# Patient Record
Sex: FEMALE | Race: BLACK OR AFRICAN AMERICAN | NOT HISPANIC OR LATINO | URBAN - NONMETROPOLITAN AREA
[De-identification: names, ages, dates, MRNs, and addresses within clinical notes are randomized per-mention and may not be internally consistent; named-entity substitution may affect disease eponyms.]

---

## 2019-07-17 ENCOUNTER — IMPORTED ENCOUNTER (OUTPATIENT)
Dept: URBAN - NONMETROPOLITAN AREA CLINIC 1 | Facility: CLINIC | Age: 74
End: 2019-07-17

## 2019-07-17 PROBLEM — H25.813: Noted: 2017-11-01

## 2019-07-17 PROBLEM — B58.00: Noted: 2018-03-14

## 2019-07-17 PROBLEM — H52.4: Noted: 2018-03-14

## 2019-07-17 PROBLEM — E11.3393: Noted: 2019-07-17

## 2019-07-17 PROCEDURE — 99213 OFFICE O/P EST LOW 20 MIN: CPT

## 2019-07-17 PROCEDURE — 92134 CPTRZ OPH DX IMG PST SGM RTA: CPT

## 2019-07-17 NOTE — PATIENT DISCUSSION
Type 2 DM 1992Last A1C 7.8% - Jan 2018. DM with Diabetic Retinopathy OU-OCT MAC performed and reviewed w/pt -Stressed the importance of keeping blood sugars under control and regular visits with PCP. -Explained the possible effects of poorly controlled diabetes and the damage that diabetes can cause to ocular health. -Pt instructed to contact our office with any vision changes. Cataract(s)-Visually significant.-Cataract(s) causing symptomatic impairment of visual function not correctable with a tolerable change in glasses or contact lenses lighting or non-operative means resulting in specific activity limitations and/or participation restrictions including but not limited to reading viewing television driving or meeting vocational or recreational needs. -Expectation is clearer vision and reduced glare disability after cataract removal.-Refer for cataract evaluationToxo scar OS-continue to monitorLetter to [facility] [place] [facility] 07/[facility]; 's Notes: OCT MAC 07/17/19

## 2019-08-19 ENCOUNTER — IMPORTED ENCOUNTER (OUTPATIENT)
Dept: URBAN - NONMETROPOLITAN AREA CLINIC 1 | Facility: CLINIC | Age: 74
End: 2019-08-19

## 2019-08-19 PROCEDURE — 92014 COMPRE OPH EXAM EST PT 1/>: CPT

## 2019-08-19 NOTE — PATIENT DISCUSSION
Cataract(s)-Visually significant cataract OU .-Cataract(s) causing symptomatic impairment of visual function not correctable with a tolerable change in glasses or contact lenses lighting or non-operative means resulting in specific activity limitations and/or participation restrictions including but not limited to reading viewing television driving or meeting vocational or recreational needs. -Expectation is clearer vision and functional improvement in symptoms as well as reduced glare disability after cataract removal.-Order IOLMaster and OPD today. -Recommend Stand/Trad IOL  based on today's OPD testing and lifestyle questionnaire.-All questions were answered regarding surgery including pre and post-op medications appointments activity restrictions and anesthetic usage.-The risks benefits and alternatives and special risk factors for the patient were discussed in detail including but not limited to: bleeding infection retinal detachment vitreous loss problems with the implant and possible need for additional surgery.-Although rare the possibility of complete vision loss was discussed.-The possible need for glasses post-operatively was discussed.-Order medical clearance exam  -Patient elects to proceed with cataract surgery OD . Will schedule at patient's convenience and re-evaluate OS  in the future. - Pt request to have all sx appts and pov in [place] office; 's Notes: OCT MAC 07/17/19

## 2019-09-23 PROBLEM — E11.3393: Noted: 2019-09-23

## 2019-09-23 PROBLEM — B58.00: Noted: 2019-09-23

## 2019-09-23 PROBLEM — H52.4: Noted: 2019-09-23

## 2019-09-23 PROBLEM — H25.813: Noted: 2019-09-23

## 2019-09-24 ENCOUNTER — IMPORTED ENCOUNTER (OUTPATIENT)
Dept: URBAN - NONMETROPOLITAN AREA CLINIC 1 | Facility: CLINIC | Age: 74
End: 2019-09-24

## 2021-02-24 NOTE — PROCEDURE NOTE: CLINICAL
PROCEDURE NOTE: Removal of Corneal FB at Slit Lamp OD. Diagnosis: Corneal Foreign Body. Anesthesia: Topical. The patient, the procedure, and the correct site were identified initially. Prior to treatment, the risks/benefits/alternatives were discussed. The patient wished to proceed with procedure. Corneal foreign body was removed using a 25 gauge needle at the slit lamp. Patient tolerated procedure well. There were no complications. Post-op instructions given. The residual rust ring was carefully buffed out of the corneal stroma. Patient tolerated procedure well. Harley Jensen

## 2021-04-12 ENCOUNTER — IMPORTED ENCOUNTER (OUTPATIENT)
Dept: URBAN - NONMETROPOLITAN AREA CLINIC 1 | Facility: CLINIC | Age: 76
End: 2021-04-12

## 2021-04-12 PROBLEM — E11.9: Noted: 2021-04-12

## 2021-04-12 PROBLEM — B58.00: Noted: 2018-03-14

## 2021-04-12 PROBLEM — H25.813: Noted: 2017-11-01

## 2021-04-12 PROBLEM — H52.4: Noted: 2018-03-14

## 2021-04-12 PROCEDURE — 92014 COMPRE OPH EXAM EST PT 1/>: CPT

## 2021-04-12 PROCEDURE — 92015 DETERMINE REFRACTIVE STATE: CPT

## 2021-04-12 NOTE — PATIENT DISCUSSION
Cataracts OU - Discussed diagnosis in detail w/ patient - Discussed signs and symptoms associated w/ progression - Discussed proceeding with surgical intervention when patient is ready; patient agrees with plan. Discussed with patient that risks during surgery can increase if she waits to long. - Patient to call or come in if she notices changes in vision. DM - Discussed diagnosis in detail w/ patient - Stressed the importance of keeping blood sugars under control blood pressure under control and weight normalization and regular visits with PCP.- Explained the possible effects of poorly controlled diabetes and the damage that diabetes can cause to ocular health. - Patient to check HgbA1C last A1C 6 something per patient report. Prior to that patient states she had blood sugars in the 800's- Pt instructed to contact our office with any vision changes. - Letter to PCPCompound Hyperopic Astigmatism w/ Presbyopia - Discussed refractive status w/ patient - Patient requests new glasses Rx today; new spectacle Rx issued; 's Notes: OCT MAC 07/17/19

## 2022-04-09 ASSESSMENT — VISUAL ACUITY
OS_CC: J1
OD_CC: 20/40
OS_AM: 20/25
OS_PH: 20/50-2
OS_SC: 20/40-1
OS_SC: 20/40
OD_SC: 20/40-1
OS_SC: 20/80
OS_GLARE: 20/200
OD_GLARE: 20/40
OS_GLARE: 20/40
OS_CC: 20/50
OD_CC: J1
OD_SC: 20/40
OD_SC: 20/30-
OD_GLARE: 20/50
OD_PAM: 20/25

## 2022-04-09 ASSESSMENT — TONOMETRY
OS_IOP_MMHG: 19
OD_IOP_MMHG: 20
OD_IOP_MMHG: 19
OD_IOP_MMHG: 19

## 2023-05-22 ENCOUNTER — COMPREHENSIVE EXAM (OUTPATIENT)
Dept: RURAL CLINIC 1 | Facility: CLINIC | Age: 78
End: 2023-05-22

## 2023-05-22 DIAGNOSIS — E11.9: ICD-10-CM

## 2023-05-22 DIAGNOSIS — H35.371: ICD-10-CM

## 2023-05-22 DIAGNOSIS — H25.813: ICD-10-CM

## 2023-05-22 DIAGNOSIS — H52.4: ICD-10-CM

## 2023-05-22 PROCEDURE — 92015 DETERMINE REFRACTIVE STATE: CPT

## 2023-05-22 PROCEDURE — 92014 COMPRE OPH EXAM EST PT 1/>: CPT

## 2023-05-22 ASSESSMENT — TONOMETRY
OD_IOP_MMHG: 18
OS_IOP_MMHG: 18

## 2023-05-22 ASSESSMENT — VISUAL ACUITY
OS_CC: 20/50
OU_CC: 20/20
OD_CC: 20/25-2

## 2024-06-06 ENCOUNTER — FOLLOW UP (OUTPATIENT)
Dept: RURAL CLINIC 1 | Facility: CLINIC | Age: 79
End: 2024-06-06

## 2024-06-06 DIAGNOSIS — E11.9: ICD-10-CM

## 2024-06-06 DIAGNOSIS — H35.371: ICD-10-CM

## 2024-06-06 DIAGNOSIS — H25.813: ICD-10-CM

## 2024-06-06 PROCEDURE — 92014 COMPRE OPH EXAM EST PT 1/>: CPT

## 2024-06-06 ASSESSMENT — TONOMETRY
OS_IOP_MMHG: 17
OD_IOP_MMHG: 17

## 2024-06-06 ASSESSMENT — VISUAL ACUITY
OS_BAT: 20/40
OD_CC: 20/30-2
OD_PH: 20/30+1
OS_CC: 20/40
OD_BAT: 20/30
OU_CC: 20/20
OS_PH: 20/40